# Patient Record
(demographics unavailable — no encounter records)

---

## 2024-10-24 NOTE — PLAN
[FreeTextEntry1] : Sore throat Symptoms are likely viral in.  Advised to increase fluids, rest, Advil or Tylenol as needed.  Lozenges.  Salt water gargle.  Diabetes Continue Ozempic  Hypertension Blood pressure is controlled on losartan/hydrochlorothiazide 100/25  Return for routine follow-up

## 2024-10-24 NOTE — REVIEW OF SYSTEMS
[Hoarseness] : no hoarseness [Nasal Discharge] : no nasal discharge [Headache] : no headache [Dizziness] : dizziness [Negative] : Gastrointestinal [FreeTextEntry2] : as noted in HPI  [FreeTextEntry4] : as noted in HPI

## 2024-10-24 NOTE — PHYSICAL EXAM
[No Acute Distress] : no acute distress [Well Nourished] : well nourished [Normal Sclera/Conjunctiva] : normal sclera/conjunctiva [Normal Outer Ear/Nose] : the outer ears and nose were normal in appearance [Normal Oropharynx] : the oropharynx was normal [Normal TMs] : both tympanic membranes were normal [No Lymphadenopathy] : no lymphadenopathy [Supple] : supple [No Respiratory Distress] : no respiratory distress  [No Accessory Muscle Use] : no accessory muscle use [Clear to Auscultation] : lungs were clear to auscultation bilaterally [Normal Rate] : normal rate  [Regular Rhythm] : with a regular rhythm [Normal S1, S2] : normal S1 and S2 [Normal Affect] : the affect was normal [Normal Insight/Judgement] : insight and judgment were intact

## 2024-10-24 NOTE — HISTORY OF PRESENT ILLNESS
[FreeTextEntry8] : 49-year-old female with a history of diabetes, hypertension, hyperlipidemia and obesity who c/o headache, sore throat and chills for 3 days. Some pain with swallowing.  No cough or ear pain. No N/V/D. Some dizziness. Appetite is ok. Tired. No sick contacts Has not taken anything over the counter. Drinks 1 bottle of water per day

## 2024-12-05 NOTE — PLAN
[FreeTextEntry1] : Advised her that for telehealth visits she should have labs done prior to the visit. Her next visit should be in person, however, so her BP and weigh can be checked    Diabetes Check HbA1c and lipids Continue Ozempic1 mg weekly. Continue glipizide  Work on improving diet and increase exercise  HTN continue Losartan/HCTZ  100/25 CHeck CMP  Hyperlipidemia Check lipids and CMP Continue atorvastatin  Back pain Rx for flexeril Note for work for today and tomorrow  Health Maintenance Encouraged compliance with medications f/u 3 months

## 2024-12-05 NOTE — HISTORY OF PRESENT ILLNESS
[Home] : at home, [unfilled] , at the time of the visit. [Other Location: e.g. Home (Enter Location, City,State)___] : at [unfilled] [Verbal consent obtained from patient] : the patient, [unfilled] [FreeTextEntry1] : f/u DM, BP lipids [de-identified] : 49 y/o female with  h/o DM, HTN and hyperlipidemia who presents for follow up.  She is on Ozempic 1 mg. She is still on glipizide She doesnt know if she has lost weight She denies polyuria and polydipsia. No exercise.  She needs to work on her diet.    SHe c/o low back pain for several days. She went to urgent care and was given methocarbamol without improvement

## 2024-12-05 NOTE — PHYSICAL EXAM
[No Acute Distress] : no acute distress [Normal Sclera/Conjunctiva] : normal sclera/conjunctiva [Normal Outer Ear/Nose] : the outer ears and nose were normal in appearance [No Respiratory Distress] : no respiratory distress  [Normal Affect] : the affect was normal [Normal Insight/Judgement] : insight and judgment were intact [de-identified] : overweight

## 2025-01-08 NOTE — PLAN
[FreeTextEntry1] : Labs reviewed with patient.  Hemoglobin A1c improved to 7.4.  LDL is controlled.  Diabetes Explained to the patient that for maximum benefit she should take the Ozempic weekly.  Will continue glipizide for now. She should work on improving her diet and increasing exercise.  She should aim for 150 minutes/week of moderate intensity exercise.  HTN BP is slightly high. For now will continue Losartan/HCTZ  100/25  Hyperlipidemia Lipids controlled on atorvastatin 20 mg Continue atorvastatin  Common cold Given a note for work for the rest of the week Rest and fluids  Health maintenance Schedule  pap Given a mammogram Rx in the past but she has not completed it COloguard ordered in the past but she has not completed at f/u 3 months

## 2025-01-08 NOTE — REVIEW OF SYSTEMS
[Fever] : no fever [Recent Change In Weight] : ~T recent weight change [Sore Throat] : sore throat [Negative] : Cardiovascular [FreeTextEntry4] : as noted in HPI

## 2025-01-08 NOTE — PHYSICAL EXAM
[No Acute Distress] : no acute distress [Well Nourished] : well nourished [Normal Sclera/Conjunctiva] : normal sclera/conjunctiva [Normal Outer Ear/Nose] : the outer ears and nose were normal in appearance [No Respiratory Distress] : no respiratory distress  [No Accessory Muscle Use] : no accessory muscle use [Clear to Auscultation] : lungs were clear to auscultation bilaterally [Normal Rate] : normal rate  [Regular Rhythm] : with a regular rhythm [Normal S1, S2] : normal S1 and S2 [No Edema] : there was no peripheral edema [No Extremity Clubbing/Cyanosis] : no extremity clubbing/cyanosis [Normal Affect] : the affect was normal [Normal Insight/Judgement] : insight and judgment were intact

## 2025-01-08 NOTE — HISTORY OF PRESENT ILLNESS
[FreeTextEntry1] : f/u DM, BP and lipids. cold symptoms [de-identified] : 48 y/o female with  h/o DM, HTN and hyperlipidemia who is here for follow up.  She was seen via telehealth 1 month ago for back pain.  When she made this appointment she still had the pain but it has since resolved. Today she complains of 2 days of sore throat, head congestion, runny nose. no fever/chills.  She works at the [employer] and sits near the door.  It is very cold.  She is not allowed to wear a coat. She is prescribed Ozempic 1 mg and glipizide 5 mg for diabetes.  She does take the glipizide every day but takes the Ozempic once or twice per month.  She is compliant with the atorvastatin. She has lost a little bit of weight. She denies polyuria and polydipsia. No exercise.  She needs to work on her diet.  She is drinking soda.    pap 05/21 mammo 4/23 colonoscopy never

## 2025-01-21 NOTE — PLAN
[FreeTextEntry1] : Back pain Ice/heat, NSAIDs  stretching  will get an x-ray May need physical therapy
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
## 2025-01-21 NOTE — PHYSICAL EXAM
[No Acute Distress] : no acute distress [Well Nourished] : well nourished [Normal Sclera/Conjunctiva] : normal sclera/conjunctiva [Normal Outer Ear/Nose] : the outer ears and nose were normal in appearance [No Spinal Tenderness] : no spinal tenderness [Normal Gait] : normal gait [Normal Affect] : the affect was normal [Normal Insight/Judgement] : insight and judgment were intact [de-identified] : tenderness of b/l low back. low back pain with straight leg raise [de-identified] : motor 5/5 b/l LW, sensation intact to light touch of b/l LE